# Patient Record
Sex: FEMALE | Race: BLACK OR AFRICAN AMERICAN | Employment: PART TIME | URBAN - METROPOLITAN AREA
[De-identification: names, ages, dates, MRNs, and addresses within clinical notes are randomized per-mention and may not be internally consistent; named-entity substitution may affect disease eponyms.]

---
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
## 2022-03-19 PROBLEM — O20.9 VAGINAL BLEEDING BEFORE 22 WEEKS GESTATION: Status: ACTIVE | Noted: 2019-02-11

## 2022-11-26 ENCOUNTER — HOSPITAL ENCOUNTER (EMERGENCY)
Age: 22
Discharge: LEFT BEFORE DISCHARGE | End: 2022-11-27
Payer: MEDICAID

## 2022-11-26 VITALS
BODY MASS INDEX: 21.01 KG/M2 | HEART RATE: 106 BPM | SYSTOLIC BLOOD PRESSURE: 115 MMHG | TEMPERATURE: 98.4 F | OXYGEN SATURATION: 100 % | RESPIRATION RATE: 18 BRPM | HEIGHT: 60 IN | DIASTOLIC BLOOD PRESSURE: 79 MMHG | WEIGHT: 107 LBS

## 2022-11-26 PROCEDURE — 75810000275 HC EMERGENCY DEPT VISIT NO LEVEL OF CARE

## 2022-11-27 ENCOUNTER — APPOINTMENT (OUTPATIENT)
Dept: GENERAL RADIOLOGY | Age: 22
End: 2022-11-27
Attending: EMERGENCY MEDICINE
Payer: MEDICAID

## 2022-11-27 ENCOUNTER — APPOINTMENT (OUTPATIENT)
Dept: CT IMAGING | Age: 22
End: 2022-11-27
Attending: EMERGENCY MEDICINE
Payer: MEDICAID

## 2022-11-27 ENCOUNTER — HOSPITAL ENCOUNTER (EMERGENCY)
Age: 22
Discharge: HOME | End: 2022-11-27
Attending: EMERGENCY MEDICINE
Payer: MEDICAID

## 2022-11-27 VITALS
OXYGEN SATURATION: 100 % | TEMPERATURE: 98.3 F | WEIGHT: 107 LBS | HEART RATE: 100 BPM | HEIGHT: 60 IN | RESPIRATION RATE: 18 BRPM | SYSTOLIC BLOOD PRESSURE: 128 MMHG | DIASTOLIC BLOOD PRESSURE: 78 MMHG | BODY MASS INDEX: 21.01 KG/M2

## 2022-11-27 DIAGNOSIS — M25.561 ARTHRALGIA OF RIGHT LOWER LEG: ICD-10-CM

## 2022-11-27 DIAGNOSIS — M79.601 PAIN OF RIGHT UPPER EXTREMITY: ICD-10-CM

## 2022-11-27 DIAGNOSIS — F07.81 POST CONCUSSIVE SYNDROME: Primary | ICD-10-CM

## 2022-11-27 PROCEDURE — 73552 X-RAY EXAM OF FEMUR 2/>: CPT

## 2022-11-27 PROCEDURE — 70450 CT HEAD/BRAIN W/O DYE: CPT

## 2022-11-27 PROCEDURE — 73590 X-RAY EXAM OF LOWER LEG: CPT

## 2022-11-27 PROCEDURE — 99284 EMERGENCY DEPT VISIT MOD MDM: CPT

## 2022-11-27 RX ORDER — METHOCARBAMOL 750 MG/1
750 TABLET, FILM COATED ORAL 4 TIMES DAILY
Qty: 20 TABLET | Refills: 0 | Status: SHIPPED | OUTPATIENT
Start: 2022-11-27

## 2022-11-27 RX ORDER — ONDANSETRON 4 MG/1
4 TABLET, ORALLY DISINTEGRATING ORAL
Qty: 12 TABLET | Refills: 0 | Status: SHIPPED | OUTPATIENT
Start: 2022-11-27

## 2022-11-27 RX ORDER — IBUPROFEN 600 MG/1
600 TABLET ORAL
Qty: 20 TABLET | Refills: 0 | Status: SHIPPED | OUTPATIENT
Start: 2022-11-27

## 2022-11-27 RX ORDER — ACETAMINOPHEN 325 MG/1
650 TABLET ORAL
Qty: 20 TABLET | Refills: 0 | Status: SHIPPED | OUTPATIENT
Start: 2022-11-27
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
## 2023-08-15 ENCOUNTER — HOSPITAL ENCOUNTER (EMERGENCY)
Facility: HOSPITAL | Age: 23
Discharge: HOME | End: 2023-08-15
Attending: STUDENT IN AN ORGANIZED HEALTH CARE EDUCATION/TRAINING PROGRAM
Payer: MEDICAID

## 2023-08-15 ENCOUNTER — APPOINTMENT (OUTPATIENT)
Facility: HOSPITAL | Age: 23
End: 2023-08-15
Payer: MEDICAID

## 2023-08-15 VITALS
DIASTOLIC BLOOD PRESSURE: 84 MMHG | OXYGEN SATURATION: 100 % | BODY MASS INDEX: 21.01 KG/M2 | WEIGHT: 107 LBS | HEART RATE: 97 BPM | SYSTOLIC BLOOD PRESSURE: 141 MMHG | HEIGHT: 60 IN | TEMPERATURE: 98.1 F | RESPIRATION RATE: 16 BRPM
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
## 2023-08-15 DIAGNOSIS — M79.602 LEFT ARM PAIN: Primary | ICD-10-CM

## 2023-08-15 LAB
EKG ATRIAL RATE: 97 BPM
EKG DIAGNOSIS: NORMAL
EKG P AXIS: 66 DEGREES
EKG P-R INTERVAL: 138 MS
EKG Q-T INTERVAL: 320 MS
EKG QRS DURATION: 80 MS
EKG QTC CALCULATION (BAZETT): 406 MS
EKG R AXIS: 14 DEGREES
EKG T AXIS: 28 DEGREES
EKG VENTRICULAR RATE: 97 BPM

## 2023-08-15 PROCEDURE — 6370000000 HC RX 637 (ALT 250 FOR IP): Performed by: STUDENT IN AN ORGANIZED HEALTH CARE EDUCATION/TRAINING PROGRAM

## 2023-08-15 PROCEDURE — 73060 X-RAY EXAM OF HUMERUS: CPT

## 2023-08-15 PROCEDURE — 6360000002 HC RX W HCPCS: Performed by: STUDENT IN AN ORGANIZED HEALTH CARE EDUCATION/TRAINING PROGRAM

## 2023-08-15 PROCEDURE — 93010 ELECTROCARDIOGRAM REPORT: CPT | Performed by: INTERNAL MEDICINE

## 2023-08-15 PROCEDURE — 93005 ELECTROCARDIOGRAM TRACING: CPT | Performed by: STUDENT IN AN ORGANIZED HEALTH CARE EDUCATION/TRAINING PROGRAM

## 2023-08-15 PROCEDURE — 96372 THER/PROPH/DIAG INJ SC/IM: CPT

## 2023-08-15 PROCEDURE — 99284 EMERGENCY DEPT VISIT MOD MDM: CPT

## 2023-08-15 RX ORDER — KETOROLAC TROMETHAMINE 15 MG/ML
15 INJECTION, SOLUTION INTRAMUSCULAR; INTRAVENOUS
Status: COMPLETED | OUTPATIENT
Start: 2023-08-15 | End: 2023-08-15

## 2023-08-15 RX ORDER — ACETAMINOPHEN 325 MG/1
650 TABLET ORAL
Status: COMPLETED | OUTPATIENT
Start: 2023-08-15 | End: 2023-08-15

## 2023-08-15 RX ADMIN — KETOROLAC TROMETHAMINE 15 MG: 15 INJECTION, SOLUTION INTRAMUSCULAR; INTRAVENOUS at 03:55

## 2023-08-15 RX ADMIN — ACETAMINOPHEN 325MG 650 MG: 325 TABLET ORAL at 03:55

## 2023-08-15 ASSESSMENT — ENCOUNTER SYMPTOMS
ABDOMINAL PAIN: 0
CONSTIPATION: 0
DIARRHEA: 0
BACK PAIN: 0
NAUSEA: 0
SHORTNESS OF BREATH: 0
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
## 2023-08-15 NOTE — ED NOTES
Patient given discharge instructions and verbalized understanding     Michaelle Quezada RN  08/15/23 7874

## 2023-08-15 NOTE — DISCHARGE INSTRUCTIONS
You were evaluated for left arm pain . Based on your work-up it was deemed that she was stable for discharge. Please take Tylenol ibuprofen as needed to help with your pain. please follow-up with your primary care physician if you have any further concerns and go over your work-up. If you experience any chest pain, shortness of breath, worsening abdominal pain, vomiting blood, worsening headache, seizures, or any worsening of your symptoms please return to the emergency department immediately. If you have any pending results or any further questions please contact the emergency department at [phone].

## 2023-10-26 ENCOUNTER — HOSPITAL ENCOUNTER (EMERGENCY)
Facility: HOSPITAL | Age: 23
Discharge: HOME | End: 2023-10-27
Attending: STUDENT IN AN ORGANIZED HEALTH CARE EDUCATION/TRAINING PROGRAM
Payer: MEDICAID

## 2023-10-26 DIAGNOSIS — N30.01 ACUTE CYSTITIS WITH HEMATURIA: ICD-10-CM

## 2023-10-26 DIAGNOSIS — O46.90 VAGINAL BLEEDING IN PREGNANCY: Primary | ICD-10-CM

## 2023-10-26 PROCEDURE — 85025 COMPLETE CBC W/AUTO DIFF WBC: CPT

## 2023-10-26 PROCEDURE — 99284 EMERGENCY DEPT VISIT MOD MDM: CPT

## 2023-10-26 PROCEDURE — 84702 CHORIONIC GONADOTROPIN TEST: CPT

## 2023-10-26 PROCEDURE — 80048 BASIC METABOLIC PNL TOTAL CA: CPT

## 2023-10-26 PROCEDURE — 84703 CHORIONIC GONADOTROPIN ASSAY: CPT

## 2023-10-26 PROCEDURE — 81001 URINALYSIS AUTO W/SCOPE: CPT

## 2023-10-26 ASSESSMENT — PAIN SCALES - GENERAL: PAINLEVEL_OUTOF10: 10

## 2023-10-26 ASSESSMENT — PAIN DESCRIPTION - LOCATION: LOCATION: ABDOMEN

## 2023-10-27 ENCOUNTER — APPOINTMENT (OUTPATIENT)
Facility: HOSPITAL | Age: 23
End: 2023-10-27
Payer: MEDICAID

## 2023-10-27 VITALS
OXYGEN SATURATION: 99 % | TEMPERATURE: 98.3 F | DIASTOLIC BLOOD PRESSURE: 72 MMHG | SYSTOLIC BLOOD PRESSURE: 122 MMHG | HEART RATE: 90 BPM | RESPIRATION RATE: 18 BRPM | HEIGHT: 60 IN | BODY MASS INDEX: 21.01 KG/M2 | WEIGHT: 107 LBS

## 2023-10-27 LAB
ANION GAP SERPL CALC-SCNC: 4 MMOL/L (ref 3–18)
APPEARANCE UR: CLEAR
BACTERIA URNS QL MICRO: ABNORMAL /HPF
BASOPHILS # BLD: 0.1 K/UL (ref 0–0.1)
BASOPHILS NFR BLD: 1 % (ref 0–2)
BILIRUB UR QL: NEGATIVE
BUN SERPL-MCNC: 7 MG/DL (ref 7–18)
BUN/CREAT SERPL: 10 (ref 12–20)
CALCIUM SERPL-MCNC: 8.7 MG/DL (ref 8.5–10.1)
CHLORIDE SERPL-SCNC: 105 MMOL/L (ref 100–111)
CO2 SERPL-SCNC: 27 MMOL/L (ref 21–32)
COLOR UR: YELLOW
CREAT SERPL-MCNC: 0.69 MG/DL (ref 0.6–1.3)
DIFFERENTIAL METHOD BLD: ABNORMAL
EOSINOPHIL # BLD: 0 K/UL (ref 0–0.4)
EOSINOPHIL NFR BLD: 0 % (ref 0–5)
EPITH CASTS URNS QL MICRO: ABNORMAL /LPF (ref 0–5)
ERYTHROCYTE [DISTWIDTH] IN BLOOD BY AUTOMATED COUNT: 20.2 % (ref 11.6–14.5)
GLUCOSE SERPL-MCNC: 69 MG/DL (ref 74–99)
GLUCOSE UR STRIP.AUTO-MCNC: NEGATIVE MG/DL
HCG SERPL QL: POSITIVE
HCG SERPL-ACNC: ABNORMAL MIU/ML (ref 0–10)
HCT VFR BLD AUTO: 32.2 % (ref 35–45)
HGB BLD-MCNC: 10.1 G/DL (ref 12–16)
HGB UR QL STRIP: ABNORMAL
IMM GRANULOCYTES # BLD AUTO: 0 K/UL (ref 0–0.04)
IMM GRANULOCYTES NFR BLD AUTO: 0 % (ref 0–0.5)
KETONES UR QL STRIP.AUTO: NEGATIVE MG/DL
LEUKOCYTE ESTERASE UR QL STRIP.AUTO: ABNORMAL
LYMPHOCYTES # BLD: 1.5 K/UL (ref 0.9–3.6)
LYMPHOCYTES NFR BLD: 12 % (ref 21–52)
MCH RBC QN AUTO: 21.7 PG (ref 24–34)
MCHC RBC AUTO-ENTMCNC: 31.4 G/DL (ref 31–37)
MCV RBC AUTO: 69.1 FL (ref 78–100)
MONOCYTES # BLD: 0.8 K/UL (ref 0.05–1.2)
MONOCYTES NFR BLD: 6 % (ref 3–10)
MUCOUS THREADS URNS QL MICRO: NEGATIVE /LPF
NEUTS SEG # BLD: 10.4 K/UL (ref 1.8–8)
NEUTS SEG NFR BLD: 81 % (ref 40–73)
NITRITE UR QL STRIP.AUTO: NEGATIVE
NRBC # BLD: 0 K/UL (ref 0–0.01)
NRBC BLD-RTO: 0 PER 100 WBC
PH UR STRIP: 6 (ref 5–8)
PLATELET # BLD AUTO: 354 K/UL (ref 135–420)
PLATELET COMMENT: ABNORMAL
PMV BLD AUTO: 10 FL (ref 9.2–11.8)
POTASSIUM SERPL-SCNC: 3.8 MMOL/L (ref 3.5–5.5)
PROT UR STRIP-MCNC: NEGATIVE MG/DL
RBC # BLD AUTO: 4.66 M/UL (ref 4.2–5.3)
RBC #/AREA URNS HPF: ABNORMAL /HPF (ref 0–5)
RBC MORPH BLD: ABNORMAL
SODIUM SERPL-SCNC: 136 MMOL/L (ref 136–145)
SP GR UR REFRACTOMETRY: 1.02 (ref 1–1.03)
UROBILINOGEN UR QL STRIP.AUTO: 1 EU/DL (ref 0.2–1)
WBC # BLD AUTO: 12.8 K/UL (ref 4.6–13.2)
WBC URNS QL MICRO: ABNORMAL /HPF (ref 0–4)

## 2023-10-27 PROCEDURE — 6370000000 HC RX 637 (ALT 250 FOR IP): Performed by: STUDENT IN AN ORGANIZED HEALTH CARE EDUCATION/TRAINING PROGRAM

## 2023-10-27 PROCEDURE — 76817 TRANSVAGINAL US OBSTETRIC: CPT

## 2023-10-27 RX ORDER — CEPHALEXIN 500 MG/1
500 CAPSULE ORAL 2 TIMES DAILY
Qty: 14 CAPSULE | Refills: 0 | Status: SHIPPED | OUTPATIENT
Start: 2023-10-27 | End: 2023-10-28

## 2023-10-27 RX ORDER — CEPHALEXIN 250 MG/1
500 CAPSULE ORAL ONCE
Status: COMPLETED | OUTPATIENT
Start: 2023-10-27 | End: 2023-10-27

## 2023-10-27 RX ADMIN — CEPHALEXIN 500 MG: 250 CAPSULE ORAL at 01:55

## 2023-10-27 ASSESSMENT — ENCOUNTER SYMPTOMS
SHORTNESS OF BREATH: 0
BACK PAIN: 0
CONSTIPATION: 0
DIARRHEA: 0
ABDOMINAL PAIN: 0
NAUSEA: 0

## 2023-10-27 ASSESSMENT — LIFESTYLE VARIABLES
HOW OFTEN DO YOU HAVE A DRINK CONTAINING ALCOHOL: NEVER
HOW MANY STANDARD DRINKS CONTAINING ALCOHOL DO YOU HAVE ON A TYPICAL DAY: PATIENT DOES NOT DRINK

## 2023-10-27 NOTE — ED NOTES
Pt presents to the ED with abd cramping and vaginal bleeding that started today .  Pt had a pos pregnancy test with LMP on 8/14     [address]  10/26/23 2011

## 2023-10-27 NOTE — ED PROVIDER NOTES
Left ovary likely corpus luteum. See additional details above. [KS]      ED Course User Index  [KS] José Miguel Cummings MD       Patient was given the following medications:  Medications   cephALEXin (KEFLEX) capsule 500 mg (500 mg Oral Given 10/27/23 0155)       CONSULTS: (Who and What was discussed)  None    Chronic Conditions: refer to [facility]     Social Determinants affecting Dx or Tx: None    Records Reviewed (source and summary of external notes): Nursing Notes    Procedures    Critical Care Time: none       Diagnosis     Clinical Impression:   1. Vaginal bleeding in pregnancy    2. Acute cystitis with hematuria        Disposition: home     Geremias Hernandez, [address] [facility]  [phone]    Go to   As needed, If symptoms worsen       Disclaimer: Sections of this note are dictated using utilizing voice recognition software. Minor typographical errors may be present. If questions arise, please do not hesitate to contact me or call our department.          José Miguel Cummings MD  10/27/23 3001

## 2023-10-27 NOTE — DISCHARGE INSTRUCTIONS
You were evaluated for vaginal bleeding during pregnancy . Based on your work-up it was deemed that she was stable for discharge. Please  your medication of keflex which was prescribed to you. Please follow-up with your primary care physician if you have any further concerns and go over your work-up. If you experience any chest pain, shortness of breath, worsening abdominal pain, vomiting blood, worsening headache, seizures, or any worsening of your symptoms please return to the emergency department immediately. If you have any pending results or any further questions please contact the emergency department at [phone].

## 2023-10-28 ENCOUNTER — HOSPITAL ENCOUNTER (EMERGENCY)
Facility: HOSPITAL | Age: 23
Discharge: HOME | End: 2023-10-28
Payer: MEDICAID

## 2023-10-28 ENCOUNTER — APPOINTMENT (OUTPATIENT)
Facility: HOSPITAL | Age: 23
End: 2023-10-28
Payer: MEDICAID

## 2023-10-28 VITALS
RESPIRATION RATE: 18 BRPM | DIASTOLIC BLOOD PRESSURE: 80 MMHG | TEMPERATURE: 98.7 F | OXYGEN SATURATION: 100 % | HEIGHT: 62 IN | BODY MASS INDEX: 19.69 KG/M2 | WEIGHT: 107 LBS | HEART RATE: 95 BPM | SYSTOLIC BLOOD PRESSURE: 122 MMHG

## 2023-10-28 DIAGNOSIS — O20.0 THREATENED MISCARRIAGE IN EARLY PREGNANCY: ICD-10-CM

## 2023-10-28 DIAGNOSIS — N30.00 ACUTE CYSTITIS WITHOUT HEMATURIA: Primary | ICD-10-CM

## 2023-10-28 LAB
APPEARANCE UR: ABNORMAL
BACTERIA URNS QL MICRO: ABNORMAL /HPF
BASOPHILS # BLD: 0 K/UL (ref 0–0.1)
BASOPHILS NFR BLD: 0 % (ref 0–2)
BILIRUB UR QL: NEGATIVE
COLOR UR: YELLOW
DIFFERENTIAL METHOD BLD: ABNORMAL
EOSINOPHIL # BLD: 0 K/UL (ref 0–0.4)
EOSINOPHIL NFR BLD: 0 % (ref 0–5)
EPITH CASTS URNS QL MICRO: ABNORMAL /LPF (ref 0–5)
ERYTHROCYTE [DISTWIDTH] IN BLOOD BY AUTOMATED COUNT: 20.1 % (ref 11.6–14.5)
GLUCOSE UR STRIP.AUTO-MCNC: NEGATIVE MG/DL
HCG SERPL-ACNC: ABNORMAL MIU/ML (ref 0–10)
HCG UR QL: POSITIVE
HCT VFR BLD AUTO: 32.7 % (ref 35–45)
HGB BLD-MCNC: 10.5 G/DL (ref 12–16)
HGB UR QL STRIP: ABNORMAL
IMM GRANULOCYTES # BLD AUTO: 0 K/UL (ref 0–0.04)
IMM GRANULOCYTES NFR BLD AUTO: 0 % (ref 0–0.5)
KETONES UR QL STRIP.AUTO: 40 MG/DL
LEUKOCYTE ESTERASE UR QL STRIP.AUTO: ABNORMAL
LYMPHOCYTES # BLD: 1.3 K/UL (ref 0.9–3.6)
LYMPHOCYTES NFR BLD: 7 % (ref 21–52)
MCH RBC QN AUTO: 21.9 PG (ref 24–34)
MCHC RBC AUTO-ENTMCNC: 32.1 G/DL (ref 31–37)
MCV RBC AUTO: 68.3 FL (ref 78–100)
MONOCYTES # BLD: 0.4 K/UL (ref 0.05–1.2)
MONOCYTES NFR BLD: 2 % (ref 3–10)
NEUTS SEG # BLD: 16.3 K/UL (ref 1.8–8)
NEUTS SEG NFR BLD: 91 % (ref 40–73)
NITRITE UR QL STRIP.AUTO: NEGATIVE
NRBC # BLD: 0 K/UL (ref 0–0.01)
NRBC BLD-RTO: 0 PER 100 WBC
PH UR STRIP: 7 (ref 5–8)
PLATELET # BLD AUTO: 349 K/UL (ref 135–420)
PLATELET COMMENT: ABNORMAL
PMV BLD AUTO: 9.7 FL (ref 9.2–11.8)
PROT UR STRIP-MCNC: 30 MG/DL
RBC # BLD AUTO: 4.79 M/UL (ref 4.2–5.3)
RBC #/AREA URNS HPF: ABNORMAL /HPF (ref 0–5)
RBC MORPH BLD: ABNORMAL
SERVICE CMNT-IMP: NORMAL
SP GR UR REFRACTOMETRY: 1.01 (ref 1–1.03)
UROBILINOGEN UR QL STRIP.AUTO: 1 EU/DL (ref 0.2–1)
WBC # BLD AUTO: 18 K/UL (ref 4.6–13.2)
WBC URNS QL MICRO: ABNORMAL /HPF (ref 0–4)
WET PREP GENITAL: NORMAL

## 2023-10-28 PROCEDURE — 87591 N.GONORRHOEAE DNA AMP PROB: CPT

## 2023-10-28 PROCEDURE — 87210 SMEAR WET MOUNT SALINE/INK: CPT

## 2023-10-28 PROCEDURE — 81001 URINALYSIS AUTO W/SCOPE: CPT

## 2023-10-28 PROCEDURE — 84702 CHORIONIC GONADOTROPIN TEST: CPT

## 2023-10-28 PROCEDURE — 76817 TRANSVAGINAL US OBSTETRIC: CPT

## 2023-10-28 PROCEDURE — 81025 URINE PREGNANCY TEST: CPT

## 2023-10-28 PROCEDURE — 87661 TRICHOMONAS VAGINALIS AMPLIF: CPT

## 2023-10-28 PROCEDURE — 87086 URINE CULTURE/COLONY COUNT: CPT

## 2023-10-28 PROCEDURE — 87491 CHLMYD TRACH DNA AMP PROBE: CPT

## 2023-10-28 PROCEDURE — 99284 EMERGENCY DEPT VISIT MOD MDM: CPT

## 2023-10-28 PROCEDURE — 85025 COMPLETE CBC W/AUTO DIFF WBC: CPT

## 2023-10-28 RX ORDER — PNV NO.95/FERROUS FUM/FOLIC AC 28MG-0.8MG
1 TABLET ORAL DAILY
Qty: 30 TABLET | Refills: 0 | Status: SHIPPED | OUTPATIENT
Start: 2023-10-28

## 2023-10-28 RX ORDER — CEPHALEXIN 500 MG/1
500 CAPSULE ORAL 2 TIMES DAILY
Qty: 14 CAPSULE | Refills: 0 | Status: SHIPPED | OUTPATIENT
Start: 2023-10-28 | End: 2023-11-04

## 2023-10-28 ASSESSMENT — PAIN SCALES - GENERAL: PAINLEVEL_OUTOF10: 8

## 2023-10-28 ASSESSMENT — ENCOUNTER SYMPTOMS
VOMITING: 0
SORE THROAT: 0
SHORTNESS OF BREATH: 0
ABDOMINAL PAIN: 0
DIARRHEA: 0

## 2023-10-28 ASSESSMENT — PAIN - FUNCTIONAL ASSESSMENT: PAIN_FUNCTIONAL_ASSESSMENT: 0-10

## 2023-10-28 NOTE — ED TRIAGE NOTES
Pt arrived via EMS. Pt reports she was here 2 days ago and discharged for vaginal bleeding. Presents today with continued bleeding. States \"y'all just sent me home in pain and I'm still in pain, something is not right\". G3- second child 18 week born still born, currently 5 weeks with period bleeding.

## 2023-10-28 NOTE — ED NOTES
Pt resting in position of comfort on cot presenting in no acute/ apparent distress (NAD). Respirations are noted even and unlabored with good rise and fall of the chest observed. Pt updated with current plan of care (POC) and all questions/ concerns addressed. Patient voices no needs at this time. Cot noted in lowest position, locked, with side rails X 1 up for patient safety. Closed pts room door per pts request. Will continue to monitor patient for acute changes.      [x] Side rail up    [x] Cart in lowest position    [] Family at bedside    [x] Call light within reach      [address] [facility], April, RN  10/28/23 3537

## 2023-10-28 NOTE — ED PROVIDER NOTES
Capillary Refill: Capillary refill takes less than 2 seconds. Neurological:      Mental Status: She is alert and oriented to person, place, and time. Mental status is at baseline.    Psychiatric:         Attention and Perception: Attention normal.         Diagnostic Study Results     Labs -     Recent Results (from the past 12 hour(s))   HCG, Quantitative, Pregnancy    Collection Time: 10/28/23 12:12 PM   Result Value Ref Range    hCG Quant 70,997 (H) 0 - 10 MIU/ML   CBC with Auto Differential    Collection Time: 10/28/23 12:12 PM   Result Value Ref Range    WBC 18.0 (H) 4.6 - 13.2 K/uL    RBC 4.79 4.20 - 5.30 M/uL    Hemoglobin 10.5 (L) 12.0 - 16.0 g/dL    Hematocrit 32.7 (L) 35.0 - 45.0 %    MCV 68.3 (L) 78.0 - 100.0 FL    MCH 21.9 (L) 24.0 - 34.0 PG    MCHC 32.1 31.0 - 37.0 g/dL    RDW 20.1 (H) 11.6 - 14.5 %    Platelets 196 722 - 887 K/uL    MPV 9.7 9.2 - 11.8 FL    Nucleated RBCs 0.0 0  WBC    nRBC 0.00 0.00 - 0.01 K/uL    Neutrophils % 91 (H) 40 - 73 %    Lymphocytes % 7 (L) 21 - 52 %    Monocytes % 2 (L) 3 - 10 %    Eosinophils % 0 0 - 5 %    Basophils % 0 0 - 2 %    Immature Granulocytes 0 0.0 - 0.5 %    Neutrophils Absolute 16.3 (H) 1.8 - 8.0 K/UL    Lymphocytes Absolute 1.3 0.9 - 3.6 K/UL    Monocytes Absolute 0.4 0.05 - 1.2 K/UL    Eosinophils Absolute 0.0 0.0 - 0.4 K/UL    Basophils Absolute 0.0 0.0 - 0.1 K/UL    Absolute Immature Granulocyte 0.0 0.00 - 0.04 K/UL    Differential Type MANUAL      Platelet Comment ADEQUATE PLATELETS      RBC Comment MICROCYTOSIS  2+        RBC Comment MACROCYTOSIS  1+        RBC Comment POLYCHROMASIA  1+       Pregnancy, Urine    Collection Time: 10/28/23  2:48 PM   Result Value Ref Range    HCG(Urine) Pregnancy Test Positive (A) NEG     Urinalysis    Collection Time: 10/28/23  2:48 PM   Result Value Ref Range    Color, UA YELLOW      Appearance BLOODY      Specific Gravity, UA 1.015 1.005 - 1.030      pH, Urine 7.0 5.0 - 8.0      Protein, UA 30 (A) NEG mg/dL return precautions associated with chief complaint and evaluation were reviewed with the patient in detail. The patient demonstrated adequate understanding. Patient was instructed to follow up with PCP, as well as given strict return precautions to the ED upon further deterioration. Patient is ready for discharge home. Dragon Disclaimer     Please note that this dictation was completed with [employer], the computer voice recognition software. Quite often unanticipated grammatical, syntax, homophones, and other interpretive errors are inadvertently transcribed by the computer software. Please disregard these errors. Please excuse any errors that have escaped final proofreading.       [address], [place]  10/28/23 5325

## 2023-10-30 LAB
BACTERIA SPEC CULT: NORMAL
C TRACH RRNA SPEC QL NAA+PROBE: POSITIVE
N GONORRHOEA RRNA SPEC QL NAA+PROBE: NEGATIVE
SERVICE CMNT-IMP: NORMAL
SPECIMEN SOURCE: ABNORMAL
T VAGINALIS RRNA SPEC QL NAA+PROBE: NEGATIVE

## 2023-10-31 RX ORDER — AZITHROMYCIN 500 MG/1
1000 TABLET, FILM COATED ORAL ONCE
Qty: 2 TABLET | Refills: 0 | Status: SHIPPED | OUTPATIENT
Start: 2023-10-31 | End: 2023-10-31

## 2025-07-01 ENCOUNTER — APPOINTMENT (OUTPATIENT)
Facility: HOSPITAL | Age: 25
End: 2025-07-01
Payer: MEDICAID

## 2025-07-01 ENCOUNTER — HOSPITAL ENCOUNTER (EMERGENCY)
Facility: HOSPITAL | Age: 25
Discharge: HOME | End: 2025-07-01
Payer: MEDICAID

## 2025-07-01 VITALS
TEMPERATURE: 98.3 F | HEIGHT: 60 IN | WEIGHT: 106 LBS | RESPIRATION RATE: 16 BRPM | SYSTOLIC BLOOD PRESSURE: 132 MMHG | BODY MASS INDEX: 20.81 KG/M2 | HEART RATE: 102 BPM | DIASTOLIC BLOOD PRESSURE: 83 MMHG | OXYGEN SATURATION: 100 %
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
## 2025-07-01 DIAGNOSIS — S73.101A SPRAIN OF RIGHT HIP, INITIAL ENCOUNTER: ICD-10-CM

## 2025-07-01 PROCEDURE — 73502 X-RAY EXAM HIP UNI 2-3 VIEWS: CPT

## 2025-07-01 PROCEDURE — 99283 EMERGENCY DEPT VISIT LOW MDM: CPT

## 2025-07-01 RX ORDER — NAPROXEN 500 MG/1
500 TABLET ORAL 2 TIMES DAILY WITH MEALS
Qty: 20 TABLET | Refills: 0 | Status: SHIPPED | OUTPATIENT
Start: 2025-07-01

## 2025-07-01 RX ORDER — LIDOCAINE 4 G/G
1 PATCH TOPICAL DAILY
Qty: 30 EACH | Refills: 0 | Status: SHIPPED | OUTPATIENT
Start: 2025-07-01

## 2025-07-01 RX ORDER — METHOCARBAMOL 500 MG/1
500 TABLET, FILM COATED ORAL 3 TIMES DAILY
Qty: 15 TABLET | Refills: 0 | Status: SHIPPED | OUTPATIENT
Start: 2025-07-01 | End: 2025-07-11

## 2025-07-01 ASSESSMENT — PAIN SCALES - GENERAL: PAINLEVEL_OUTOF10: 9

## 2025-07-01 ASSESSMENT — PAIN - FUNCTIONAL ASSESSMENT: PAIN_FUNCTIONAL_ASSESSMENT: 0-10

## 2025-07-01 ASSESSMENT — PAIN DESCRIPTION - LOCATION: LOCATION: HEAD
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
## 2025-07-01 NOTE — FLOWSHEET NOTE
07/01/25 1752   ED Departure Condition    Mobility at Departure Ambulatory   Ambulatory Mobility With No Difficulty   Departure Mode With family   Discharged To Home
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
